# Patient Record
Sex: FEMALE | Race: WHITE | NOT HISPANIC OR LATINO | Employment: UNEMPLOYED | URBAN - NONMETROPOLITAN AREA
[De-identification: names, ages, dates, MRNs, and addresses within clinical notes are randomized per-mention and may not be internally consistent; named-entity substitution may affect disease eponyms.]

---

## 2017-01-05 ENCOUNTER — OFFICE VISIT (OUTPATIENT)
Dept: OPHTHALMOLOGY | Facility: CLINIC | Age: 52
End: 2017-01-05

## 2017-01-05 DIAGNOSIS — H31.101: Primary | ICD-10-CM

## 2017-01-05 DIAGNOSIS — H52.13 MYOPIA, BILATERAL: ICD-10-CM

## 2017-01-05 PROBLEM — H52.10 MYOPIA: Status: ACTIVE | Noted: 2017-01-05

## 2017-01-05 PROBLEM — H31.109: Status: ACTIVE | Noted: 2017-01-05

## 2017-01-05 PROCEDURE — 92012 INTRM OPH EXAM EST PATIENT: CPT | Performed by: OPHTHALMOLOGY

## 2017-01-05 NOTE — PROGRESS NOTES
Subjective   Glenis Brower is a 51 y.o. female.   Chief Complaint   Patient presents with   • Eye Exam       HPI     Eye Exam   In both eyes.           Comments   Exam needs pressure ck for contacts       Last edited by Romulo Escalona MD on 1/5/2017  9:42 AM. (History)          Review of Systems   Eyes: Negative for pain and visual disturbance.       Objective   Visual Acuity (Snellen - Linear)      Right Left   Dist cc 20/20 20/20   Near cc J2 J2            Wearing Rx      Sphere   Right -2.50   Left -2.50                 Pupils      Pupils   Right PERRL   Left PERRL           Confrontational Visual Fields     Visual Fields      Left Right   Result Full Full                  Extraocular Movement      Right Left   Result Full, Ortho Full, Ortho              Tonometry (Applanation, 9:53 AM)      Right Left   Pressure 18 18              Main Ophthalmology Exam     External Exam      Right Left    External Normal Normal      Slit Lamp Exam      Right Left    Lids/Lashes Normal Normal    Conjunctiva/Sclera White and quiet White and quiet    Cornea Clear Clear    Anterior Chamber Deep and quiet Deep and quiet    Iris Round and reactive Round and reactive    Lens fetal nuclear cataract fetal nuclear cataract    Vitreous Normal Normal      Fundus Exam      Right Left    Disc Normal Normal    Macula Normal Normal    Vessels Normal Normal    Periphery  Normal                Assessment/Plan   Problems Addressed this Visit     Choroidal degeneration - Primary    Myopia